# Patient Record
(demographics unavailable — no encounter records)

---

## 2024-11-04 NOTE — ASSESSMENT
[FreeTextEntry1] : 43 yof w/ severe back and bilateral leg pain.  I have personally reviewed the patient's CT in detail and discussed my personal interpretation of the study which is significant for no significant stenosis.  The patient has failed to have relief with over six weeks of physical therapy within the last three months and all medications. GIven their failure to improve with all other conservative measures recommend MRI lumbar spine. Patient will return to review imaging and plan for potential intervention.  Physical therapy prescribed - goal will be to increase ROM, strengthening, postural training, other modalities ad rosi which may include massage and stim. Goals of therapy discussed with the patient in detail and will be discussed with physical therapist. Patient will follow-up following course of physical therapy to monitor progress and adjust therapy as needed.  Acetaminophen 1,000 mg q8h prn for moderate pain. Risks, benefits, and alternatives of acetaminophen discussed with patient.  Ibuprofen 600 mg q8h prn add when pain is not adequately controlled with acetaminophen. Risks, benefits, and alternatives of ibuprofen discussed with patient.  Diet and nutritional strategies discussed which may improve patients pain and will improve overall health.

## 2024-11-04 NOTE — DATA REVIEWED
[FreeTextEntry1] : Exam: CT LUMBAR SPINE  INDICATION: l4/5 pain with bilateral sciatica COMPARISON: None available at the time of interpretation. ______________________ FINDINGS:  There are 5 lumbar type vertebral bodies.  Mild lumbar levoscoliosis with an apex near L3-L4. Lumbar alignment otherwise maintained. Lumbar vertebral body heights maintained. No acute fracture. No aggressive osseous lesions. Normal mine ralization.  No findings suspicious for high grade lumbar spinal canal stenosis or neuroforaminal stenosis.  Paraspinal Tissues: Unremarkable. ______________________  IMPRESSION: 1. No acute osseous abnormality. 2. No findings suspicious for high-grade lumbar spinal canal or neuroforaminal stenosis

## 2024-11-04 NOTE — HISTORY OF PRESENT ILLNESS
[Back Pain] : back pain [___ mths] : [unfilled] month(s) ago [Constant] : constant [7] : a minimum pain level of 7/10 [10] : a maximum pain level of 10/10 [Aching] : aching [Sitting] : sitting [Bending] : bending [Lifting] : lifting [Medications] : medications [Heat] : heat [FreeTextEntry1] : HPI: Ms. EVI CALIXTO is a 43 year F pmhx of bilateral carpal tunnel, cervical and lumbar radiculopathy. Numbness and tingling to left thigh and upper extremities. Reports worsening lower back pain that began 1 week ago after lifting something from the ground. Pain so severe she went to ED for the pain. Flexeril and Medrol dose pack with mild improvement in symptoms. Pain is impacting her quality of life and ability to perform ADL's. Denies any additional weakness, numbness, bowel/bladder dysfunction, history of bleeding disorders. Quality of life is impaired. There has been a severe exacerbation of the patient's chronic pain.

## 2024-11-04 NOTE — PHYSICAL EXAM
[de-identified] : Constitutional: Well-developed, in no acute distress  Musculoskeletal: Lumbar Spine:   Gait: Antalgic 		Inspection: Normal curvature, no abnormal kyphosis or scoliosis 		Facet loading: pain bilaterally 		Palpation: 			Lumbar and paraspinal muscles: pain bilaterally 			Sacroiliac joint: no pain 			Greater trochanter: no pain 		Muscle Strength: 		Iliopsoas: 5/5 bilaterally 		Quadriceps: 5/5 bilaterally 		Hamstrings: 5/5 bilaterally 		Tibialis anterior: 5/5 bilaterally 		Extensor hallucis longus: 5/5 bilaterally  		Sensation: normal and equal in bilateral lower extremities  Extremity: no edema noted Neurological: Memory normal, AAO x 3, Cranial nerves II - XII grossly normal Psychiatric: Appropriate mood and affect, oriented to time, place, person, and situation

## 2025-03-25 NOTE — ASSESSMENT
[FreeTextEntry1] : 43 yof w/ severe back and bilateral leg pain.  I have personally reviewed the patient's CT in detail and discussed my personal interpretation of the study which is significant for no significant stenosis.  I have personally reviewed the patient's MRI in detail and discussed my personal interpretation of the study which is significant for a disc herniation at L5-S1.  Physical therapy prescribed - goal will be to increase ROM, strengthening, postural training, other modalities ad rosi which may include massage and stim. Goals of therapy discussed with the patient in detail and will be discussed with physical therapist. Patient will follow-up following course of physical therapy to monitor progress and adjust therapy as needed.  If no relief from PT, consider BRIAN.   Acetaminophen 1,000 mg q8h prn for moderate pain. Risks, benefits, and alternatives of acetaminophen discussed with patient.  Ibuprofen 600 mg q8h prn add when pain is not adequately controlled with acetaminophen. Risks, benefits, and alternatives of ibuprofen discussed with patient.  Diet and nutritional strategies discussed which may improve patients pain and will improve overall health.

## 2025-03-25 NOTE — HISTORY OF PRESENT ILLNESS
[Back Pain] : back pain [___ mths] : [unfilled] month(s) ago [Constant] : constant [7] : a minimum pain level of 7/10 [10] : a maximum pain level of 10/10 [Aching] : aching [Sitting] : sitting [Bending] : bending [Lifting] : lifting [Medications] : medications [Heat] : heat [FreeTextEntry1] : Interval history: Pt returns for follow-up. She wishes to review MRI. No recent changes in health. Pain remains in the low back and left leg. Quality of life is impaired. There has been a severe exacerbation of the patient's chronic pain.   HPI: Ms. EVI CALIXTO is a 43 year F pmhx of bilateral carpal tunnel, cervical and lumbar radiculopathy. Numbness and tingling to left thigh and upper extremities. Reports worsening lower back pain that began 1 week ago after lifting something from the ground. Pain so severe she went to ED for the pain. Flexeril and Medrol dose pack with mild improvement in symptoms. Pain is impacting her quality of life and ability to perform ADL's. Denies any additional weakness, numbness, bowel/bladder dysfunction, history of bleeding disorders. Quality of life is impaired. There has been a severe exacerbation of the patient's chronic pain.

## 2025-03-25 NOTE — PHYSICAL EXAM
[de-identified] : Constitutional: Well-developed, in no acute distress  Musculoskeletal: Lumbar Spine:   Gait: Antalgic 		Inspection: Normal curvature, no abnormal kyphosis or scoliosis 		Facet loading: pain bilaterally 		Palpation: 			Lumbar and paraspinal muscles: pain bilaterally 			Sacroiliac joint: no pain 			Greater trochanter: no pain 		Muscle Strength: 		Iliopsoas: 5/5 bilaterally 		Quadriceps: 5/5 bilaterally 		Hamstrings: 5/5 bilaterally 		Tibialis anterior: 5/5 bilaterally 		Extensor hallucis longus: 5/5 bilaterally  		Sensation: normal and equal in bilateral lower extremities  Extremity: no edema noted Neurological: Memory normal, AAO x 3, Cranial nerves II - XII grossly normal Psychiatric: Appropriate mood and affect, oriented to time, place, person, and situation

## 2025-03-25 NOTE — DATA REVIEWED
[FreeTextEntry1] : Exam Date:      03/16/25 Exam:         MRI LUMBAR SPINE   ALIGNMENT: Straightening of the spine without listhesis.   VERTEBRAE: The vertebral bodies are normal in height. There is no acute  fracture or aggressive osseous lesion.   DISCS: The disc spaces are maintained.    EVALUATION OF INDIVIDUAL LEVELS:   L1-2: No disc herniation, spinal canal or neuroforaminal stenosis.   L2-3: No disc herniation, spinal canal or neuroforaminal stenosis.   L3-4: No disc herniation, spinal canal or neuroforaminal stenosis.   L4-5: Disc bulge and mild bilateral facet arthrosis. Findings contribute to mild bilateral neuroforaminal stenosis. No central stenosis.   L5-S1: Moderate left subarticular disc protrusion contacts the descending left S1 nerve root. Mild bilateral facet arthropathy. No central spinal or foraminal stenosis.    CONUS MEDULLARIS AND CAUDA EQUINA: The conus medullaris terminates at L1. There is normal appearance of the conus medullaris and cauda equina.   PARAVERTEBRAL SOFT TISSUES AND VISUALIZED RETROPERITONEUM: The visualized paravertebral soft tissues appear within normal limits.   LIMITED EVALUATION OF UPPER SACRUM AND SACROILIAC JOINTS: Unremarkable.   IMPRESSION:   L5-S1 small left subarticular and foraminal disc protrusion contacts the descending left S1 nerve root. No spinal canal or neuroforaminal stenosis.   Exam: CT LUMBAR SPINE  INDICATION: l4/5 pain with bilateral sciatica COMPARISON: None available at the time of interpretation. ______________________ FINDINGS:  There are 5 lumbar type vertebral bodies.  Mild lumbar levoscoliosis with an apex near L3-L4. Lumbar alignment otherwise maintained. Lumbar vertebral body heights maintained. No acute fracture. No aggressive osseous lesions. Normal mine ralization.  No findings suspicious for high grade lumbar spinal canal stenosis or neuroforaminal stenosis.  Paraspinal Tissues: Unremarkable. ______________________  IMPRESSION: 1. No acute osseous abnormality. 2. No findings suspicious for high-grade lumbar spinal canal or neuroforaminal stenosis